# Patient Record
Sex: FEMALE | Race: WHITE
[De-identification: names, ages, dates, MRNs, and addresses within clinical notes are randomized per-mention and may not be internally consistent; named-entity substitution may affect disease eponyms.]

---

## 2017-07-07 ENCOUNTER — HOSPITAL ENCOUNTER (OUTPATIENT)
Dept: HOSPITAL 84 - D.US | Age: 57
Discharge: HOME | End: 2017-07-07
Attending: FAMILY MEDICINE
Payer: COMMERCIAL

## 2017-07-07 DIAGNOSIS — M79.662: Primary | ICD-10-CM

## 2017-07-07 DIAGNOSIS — R60.0: ICD-10-CM

## 2017-10-10 ENCOUNTER — HOSPITAL ENCOUNTER (OUTPATIENT)
Dept: HOSPITAL 84 - D.MRI | Age: 57
Discharge: HOME | End: 2017-10-10
Attending: ORTHOPAEDIC SURGERY
Payer: COMMERCIAL

## 2017-10-10 VITALS — BODY MASS INDEX: 43.1 KG/M2

## 2017-10-10 DIAGNOSIS — S83.231A: Primary | ICD-10-CM

## 2017-11-01 LAB
ANION GAP SERPL CALC-SCNC: 15.8 MMOL/L (ref 8–16)
BUN SERPL-MCNC: 15 MG/DL (ref 7–18)
CALCIUM SERPL-MCNC: 8.7 MG/DL (ref 8.5–10.1)
CHLORIDE SERPL-SCNC: 104 MMOL/L (ref 98–107)
CO2 SERPL-SCNC: 26.6 MMOL/L (ref 21–32)
CREAT SERPL-MCNC: 0.7 MG/DL (ref 0.6–1.3)
ERYTHROCYTE [DISTWIDTH] IN BLOOD BY AUTOMATED COUNT: 13.4 % (ref 11.5–14.5)
GLUCOSE SERPL-MCNC: 105 MG/DL (ref 74–106)
HCT VFR BLD CALC: 43.4 % (ref 36–48)
HGB BLD-MCNC: 14.4 G/DL (ref 12–16)
MCH RBC QN AUTO: 31 PG (ref 26–34)
MCHC RBC AUTO-ENTMCNC: 33.2 G/DL (ref 31–37)
MCV RBC: 93.3 FL (ref 80–100)
OSMOLALITY SERPL CALC.SUM OF ELEC: 285 MOSM/KG (ref 275–300)
PLATELET # BLD: 238 10X3/UL (ref 130–400)
PMV BLD AUTO: 10.4 FL (ref 7.4–10.4)
POTASSIUM SERPL-SCNC: 3.4 MMOL/L (ref 3.5–5.1)
RBC # BLD AUTO: 4.65 10X6/UL (ref 4–5.4)
SODIUM SERPL-SCNC: 143 MMOL/L (ref 136–145)
WBC # BLD AUTO: 9.5 10X3/UL (ref 4.8–10.8)

## 2017-11-02 ENCOUNTER — HOSPITAL ENCOUNTER (OUTPATIENT)
Dept: HOSPITAL 84 - D.OPS | Age: 57
Discharge: HOME | End: 2017-11-02
Attending: ORTHOPAEDIC SURGERY
Payer: COMMERCIAL

## 2017-11-02 VITALS — SYSTOLIC BLOOD PRESSURE: 147 MMHG | DIASTOLIC BLOOD PRESSURE: 85 MMHG

## 2017-11-02 VITALS — BODY MASS INDEX: 43.1 KG/M2

## 2017-11-02 DIAGNOSIS — S83.232A: Primary | ICD-10-CM

## 2017-11-02 DIAGNOSIS — Z01.812: ICD-10-CM
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
## 2017-11-02 DIAGNOSIS — M94.262: ICD-10-CM

## 2017-11-05 NOTE — OP
PATIENT NAME:  RAMIREZ HUERTA                             MEDICAL RECORD: [id number]
:56                                             LOCATION:D.OPS          
                                                         ADMISSION DATE:        
SURGEON:  MINESH MORRIS MD        
 
 
DATE OF OPERATION:  2017
 
PREOPERATIVE DIAGNOSIS:  Medial meniscus tear of the left knee.
 
POSTOPERATIVE DIAGNOSES:
1.  Medial meniscus tear of the left knee.
2.  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
 
PROCEDURES:
1. Arthroscopic partial medial meniscectomy.
2. Arthroscopic chondroplasty medial femoral condyle.
 
SURGEON:  Minesh Morris MD
 
ANESTHESIA:  General.
 
INTRAOPERATIVE COMPLICATIONS:  None.
 
SUMMARY OF PATHOLOGIC FINDINGS:  The patient had a very complex tear of the
posterior horn of medial meniscus and it had a very small area of approximately
3-1/2 x 3-1/2 mm of loose chondral cartilage.  This was taken back using the
torpedo resector to a stable smooth cartilage.
 
OPERATIVE SUMMARY IN DETAIL:  After obtaining the appropriate preoperative
orthopedic surgery consent as well as anesthetic consultation, evaluation and
clearance, the patient was brought to the operating room and placed on the
operating table in supine position.  After general laryngeal mask was
administered, tourniquet was placed about the proximal aspect of the left lower
extremity.  Left lower extremity was then prepped and draped in routine sterile
fashion.  The leg was elevated and exsanguinated, tourniquet inflated to 350
mmHg.  Routine inferolateral portal was established followed by superomedial
portal and inferomedial portal.  Diagnostic arthroscopy did reveal the above
findings.  Attention was first turned to the complex medial meniscus tear.  A
combination of a mechanical meniscotomes as well as an arthroscopic resector
were utilized to debride the medial meniscus back to stable elements.  At this
point, the torpedo tip bur was placed on it.  Gentle chondroplasty was done of
the small areas as described above.  It was smoothened nicely with no further
loose chondral elements.  Having completed this, the knee was insufflated with
30 cc of 0.25% Marcaine with epinephrine and 80 mg of Depo-Medrol.  Arthroscopy
portals were closed in routine interrupted fashion using 4-0 Prolene.  Sterile
dressings were applied.  The tourniquet was deflated.  The patient was awakened
and taken to the recovery room in stable condition.  All final needle and sponge
counts were correct.
 
TRANSINT:[id number] Voice Confirmation ID: [id number] DOCUMENT ID: [id number]
 
 
 
OPERATIVE REPORT                               [id number]    RAMIREZ HUERTA MD, MINESH MEYERS        
 
 
 
Electronically Signed by MINESH MORRIS MD on 17 at 0932
 
 
 
 
 
 
 
 
 
 
 
 
 
 
 
 
 
 
 
 
 
 
 
 
 
 
 
 
 
 
 
 
 
 
 
 
 
 
 
 
 
CC:                                                             [id number]
DICTATION DATE: 17 165     :     17 [address] [place] [address] 
                                                                      17
[place] [id number] [place] [id number]

## 2018-10-12 ENCOUNTER — HOSPITAL ENCOUNTER (OUTPATIENT)
Dept: HOSPITAL 84 - D.MAMMO | Age: 58
Discharge: HOME | End: 2018-10-12
Payer: MEDICAID

## 2018-10-12 VITALS — BODY MASS INDEX: 43.1 KG/M2

## 2018-10-12 DIAGNOSIS — Z12.31: Primary | ICD-10-CM

## 2020-10-08 ENCOUNTER — HOSPITAL ENCOUNTER (OUTPATIENT)
Dept: HOSPITAL 84 - D.MAMMO | Age: 60
Discharge: HOME | End: 2020-10-08
Attending: NURSE PRACTITIONER
Payer: MEDICAID

## 2020-10-08 VITALS — BODY MASS INDEX: 43.1 KG/M2

## 2020-10-08 DIAGNOSIS — Z12.31: Primary | ICD-10-CM
